# Patient Record
Sex: MALE | Race: WHITE | NOT HISPANIC OR LATINO | Employment: FULL TIME | URBAN - METROPOLITAN AREA
[De-identification: names, ages, dates, MRNs, and addresses within clinical notes are randomized per-mention and may not be internally consistent; named-entity substitution may affect disease eponyms.]

---

## 2020-12-10 ENCOUNTER — HOSPITAL ENCOUNTER (EMERGENCY)
Facility: MEDICAL CENTER | Age: 60
End: 2020-12-10
Attending: EMERGENCY MEDICINE
Payer: COMMERCIAL

## 2020-12-10 ENCOUNTER — APPOINTMENT (OUTPATIENT)
Dept: RADIOLOGY | Facility: MEDICAL CENTER | Age: 60
End: 2020-12-10
Attending: EMERGENCY MEDICINE
Payer: COMMERCIAL

## 2020-12-10 VITALS
HEIGHT: 71 IN | SYSTOLIC BLOOD PRESSURE: 164 MMHG | OXYGEN SATURATION: 93 % | DIASTOLIC BLOOD PRESSURE: 104 MMHG | HEART RATE: 96 BPM | TEMPERATURE: 96.7 F | WEIGHT: 227.51 LBS | BODY MASS INDEX: 31.85 KG/M2 | RESPIRATION RATE: 18 BRPM

## 2020-12-10 VITALS
DIASTOLIC BLOOD PRESSURE: 99 MMHG | RESPIRATION RATE: 16 BRPM | OXYGEN SATURATION: 96 % | BODY MASS INDEX: 31.79 KG/M2 | HEART RATE: 84 BPM | WEIGHT: 227.07 LBS | TEMPERATURE: 98.4 F | HEIGHT: 71 IN | SYSTOLIC BLOOD PRESSURE: 170 MMHG

## 2020-12-10 DIAGNOSIS — N23 RENAL COLIC: ICD-10-CM

## 2020-12-10 DIAGNOSIS — N20.1 URETEROLITHIASIS: ICD-10-CM

## 2020-12-10 LAB
ALBUMIN SERPL BCP-MCNC: 4.2 G/DL (ref 3.2–4.9)
ALBUMIN/GLOB SERPL: 1.7 G/DL
ALP SERPL-CCNC: 112 U/L (ref 30–99)
ALT SERPL-CCNC: 26 U/L (ref 2–50)
ANION GAP SERPL CALC-SCNC: 11 MMOL/L (ref 7–16)
APPEARANCE UR: CLEAR
AST SERPL-CCNC: 22 U/L (ref 12–45)
BACTERIA #/AREA URNS HPF: ABNORMAL /HPF
BASOPHILS # BLD AUTO: 0.3 % (ref 0–1.8)
BASOPHILS # BLD: 0.03 K/UL (ref 0–0.12)
BILIRUB SERPL-MCNC: 1.5 MG/DL (ref 0.1–1.5)
BILIRUB UR QL STRIP.AUTO: NEGATIVE
BUN SERPL-MCNC: 18 MG/DL (ref 8–22)
CALCIUM SERPL-MCNC: 8.9 MG/DL (ref 8.4–10.2)
CHLORIDE SERPL-SCNC: 102 MMOL/L (ref 96–112)
CO2 SERPL-SCNC: 23 MMOL/L (ref 20–33)
COLOR UR: YELLOW
CREAT SERPL-MCNC: 1.28 MG/DL (ref 0.5–1.4)
EOSINOPHIL # BLD AUTO: 0.02 K/UL (ref 0–0.51)
EOSINOPHIL NFR BLD: 0.2 % (ref 0–6.9)
EPI CELLS #/AREA URNS HPF: ABNORMAL /HPF
ERYTHROCYTE [DISTWIDTH] IN BLOOD BY AUTOMATED COUNT: 35.6 FL (ref 35.9–50)
GLOBULIN SER CALC-MCNC: 2.5 G/DL (ref 1.9–3.5)
GLUCOSE SERPL-MCNC: 129 MG/DL (ref 65–99)
GLUCOSE UR STRIP.AUTO-MCNC: NEGATIVE MG/DL
HCT VFR BLD AUTO: 48.1 % (ref 42–52)
HGB BLD-MCNC: 17.1 G/DL (ref 14–18)
IMM GRANULOCYTES # BLD AUTO: 0.03 K/UL (ref 0–0.11)
IMM GRANULOCYTES NFR BLD AUTO: 0.3 % (ref 0–0.9)
KETONES UR STRIP.AUTO-MCNC: 15 MG/DL
LEUKOCYTE ESTERASE UR QL STRIP.AUTO: NEGATIVE
LIPASE SERPL-CCNC: 13 U/L (ref 7–58)
LYMPHOCYTES # BLD AUTO: 0.83 K/UL (ref 1–4.8)
LYMPHOCYTES NFR BLD: 7.5 % (ref 22–41)
MCH RBC QN AUTO: 29 PG (ref 27–33)
MCHC RBC AUTO-ENTMCNC: 35.6 G/DL (ref 33.7–35.3)
MCV RBC AUTO: 81.7 FL (ref 81.4–97.8)
MICRO URNS: ABNORMAL
MONOCYTES # BLD AUTO: 0.61 K/UL (ref 0–0.85)
MONOCYTES NFR BLD AUTO: 5.5 % (ref 0–13.4)
MUCOUS THREADS #/AREA URNS HPF: ABNORMAL /HPF
NEUTROPHILS # BLD AUTO: 9.5 K/UL (ref 1.82–7.42)
NEUTROPHILS NFR BLD: 86.2 % (ref 44–72)
NITRITE UR QL STRIP.AUTO: NEGATIVE
NRBC # BLD AUTO: 0 K/UL
NRBC BLD-RTO: 0 /100 WBC
PH UR STRIP.AUTO: 5.5 [PH] (ref 5–8)
PLATELET # BLD AUTO: 178 K/UL (ref 164–446)
PMV BLD AUTO: 8.8 FL (ref 9–12.9)
POTASSIUM SERPL-SCNC: 3.9 MMOL/L (ref 3.6–5.5)
PROT SERPL-MCNC: 6.7 G/DL (ref 6–8.2)
PROT UR QL STRIP: NEGATIVE MG/DL
RBC # BLD AUTO: 5.89 M/UL (ref 4.7–6.1)
RBC # URNS HPF: ABNORMAL /HPF
RBC UR QL AUTO: ABNORMAL
SODIUM SERPL-SCNC: 136 MMOL/L (ref 135–145)
SP GR UR REFRACTOMETRY: 1.03
WBC # BLD AUTO: 11 K/UL (ref 4.8–10.8)
WBC #/AREA URNS HPF: ABNORMAL /HPF

## 2020-12-10 PROCEDURE — 83690 ASSAY OF LIPASE: CPT

## 2020-12-10 PROCEDURE — 99281 EMR DPT VST MAYX REQ PHY/QHP: CPT

## 2020-12-10 PROCEDURE — 71046 X-RAY EXAM CHEST 2 VIEWS: CPT

## 2020-12-10 PROCEDURE — 85025 COMPLETE CBC W/AUTO DIFF WBC: CPT

## 2020-12-10 PROCEDURE — 93005 ELECTROCARDIOGRAM TRACING: CPT | Performed by: EMERGENCY MEDICINE

## 2020-12-10 PROCEDURE — 81001 URINALYSIS AUTO W/SCOPE: CPT

## 2020-12-10 PROCEDURE — A9270 NON-COVERED ITEM OR SERVICE: HCPCS | Performed by: EMERGENCY MEDICINE

## 2020-12-10 PROCEDURE — 700105 HCHG RX REV CODE 258: Performed by: EMERGENCY MEDICINE

## 2020-12-10 PROCEDURE — 74176 CT ABD & PELVIS W/O CONTRAST: CPT

## 2020-12-10 PROCEDURE — 700102 HCHG RX REV CODE 250 W/ 637 OVERRIDE(OP): Performed by: EMERGENCY MEDICINE

## 2020-12-10 PROCEDURE — 99285 EMERGENCY DEPT VISIT HI MDM: CPT

## 2020-12-10 PROCEDURE — 80053 COMPREHEN METABOLIC PANEL: CPT

## 2020-12-10 PROCEDURE — 74018 RADEX ABDOMEN 1 VIEW: CPT

## 2020-12-10 RX ORDER — IBUPROFEN 200 MG
400 TABLET ORAL EVERY 6 HOURS PRN
COMMUNITY

## 2020-12-10 RX ORDER — TAMSULOSIN HYDROCHLORIDE 0.4 MG/1
0.4 CAPSULE ORAL DAILY
Qty: 5 CAP | Refills: 0 | Status: SHIPPED | OUTPATIENT
Start: 2020-12-10

## 2020-12-10 RX ORDER — SODIUM CHLORIDE 9 MG/ML
1000 INJECTION, SOLUTION INTRAVENOUS ONCE
Status: COMPLETED | OUTPATIENT
Start: 2020-12-10 | End: 2020-12-10

## 2020-12-10 RX ORDER — KETOROLAC TROMETHAMINE 30 MG/ML
30 INJECTION, SOLUTION INTRAMUSCULAR; INTRAVENOUS ONCE
Status: DISCONTINUED | OUTPATIENT
Start: 2020-12-10 | End: 2020-12-10

## 2020-12-10 RX ORDER — ONDANSETRON 4 MG/1
4 TABLET, ORALLY DISINTEGRATING ORAL EVERY 8 HOURS PRN
Qty: 9 TAB | Refills: 0 | Status: SHIPPED | OUTPATIENT
Start: 2020-12-10 | End: 2020-12-13

## 2020-12-10 RX ORDER — IBUPROFEN 600 MG/1
600 TABLET ORAL ONCE
Status: COMPLETED | OUTPATIENT
Start: 2020-12-10 | End: 2020-12-10

## 2020-12-10 RX ORDER — OXYCODONE HYDROCHLORIDE AND ACETAMINOPHEN 5; 325 MG/1; MG/1
1 TABLET ORAL EVERY 6 HOURS PRN
Qty: 12 TAB | Refills: 0 | Status: SHIPPED | OUTPATIENT
Start: 2020-12-10 | End: 2020-12-13

## 2020-12-10 RX ADMIN — SODIUM CHLORIDE 1000 ML: 9 INJECTION, SOLUTION INTRAVENOUS at 08:51

## 2020-12-10 RX ADMIN — IBUPROFEN 600 MG: 600 TABLET, FILM COATED ORAL at 07:54

## 2020-12-10 SDOH — HEALTH STABILITY: MENTAL HEALTH: HOW OFTEN DO YOU HAVE A DRINK CONTAINING ALCOHOL?: NEVER

## 2020-12-10 ASSESSMENT — PAIN DESCRIPTION - DESCRIPTORS: DESCRIPTORS: SHARP

## 2020-12-10 ASSESSMENT — FIBROSIS 4 INDEX: FIB4 SCORE: 1.41

## 2020-12-10 NOTE — ED PROVIDER NOTES
"ED Provider Note    CHIEF COMPLAINT  Chief Complaint   Patient presents with   • LLQ Pain       HPI  Akosua Castaneda is a 60 y.o. male who presents with left upper quadrant abdominal pain radiating to the back onset yesterday around noon.  Pain is constant but varies in intensity without clear trigger except the drinking tends to make it a little worse.  He took ibuprofen yesterday but denies regular NSAID use or alcohol use.  No gastritis ulcers or pancreatitis.  No diabetes or immune compromise.  No fever nausea vomiting diarrhea or constipation.  Patient denies having any medical problems.  No past surgical history of the abdomen.  Severity can be moderately severe.    REVIEW OF SYSTEMS  Pertinent positives include: Abdominal pain.  Pertinent negatives include: Urea, urgency, frequency, hematuria, family history of kidney stones, cough, chest pain, shortness of breath, loss of taste or smell, rash, leg swelling.  10+ systems reviewed and negative.      PAST MEDICAL HISTORY  Denies    FAMILY HISTORY  No renal colic    SOCIAL HISTORY  Social History     Tobacco Use   • Smoking status: Never Smoker   • Smokeless tobacco: Never Used   Substance Use Topics   • Alcohol use: Never     Frequency: Never   • Drug use: Never     Social History     Substance and Sexual Activity   Drug Use Never       SURGICAL HISTORY  Prior colonoscopy    CURRENT MEDICATIONS  Occasional ibuprofen    ALLERGIES  No Known Allergies    PHYSICAL EXAM  VITAL SIGNS: BP (!) 169/113   Pulse 95   Temp 36.3 °C (97.4 °F) (Oral)   Resp 14   Ht 1.803 m (5' 11\")   Wt 103 kg (227 lb 1.2 oz)   SpO2 92%   BMI 31.67 kg/m²   Reviewed and hypertensive  Constitutional: Well developed, Well nourished, currently overweight, well-appearing.  HENT: Normocephalic, atraumatic, bilateral external ears normal, Wearing mask.   Eyes: PERRLA, conjunctiva pink, no scleral icterus.   Cardiovascular: Regular S1-S2 without murmur, rub, gallop.  No dependent edema or " calf tenderness.  Respiratory: No rales, rhonchi, wheeze or cough.  Gastrointestinal: Soft, mild to moderate left upper quadrant abdominal tenderness with voluntary guarding to deep palpation, no rebound, no distention, no masses, bowel sounds present, no McBurney's point tenderness. Olson's sign negative.  Skin: No erythema, no rash.   Genitourinary:  No costovertebral angle tenderness.  No inguinal hernias or testicular tenderness  Neurologic: Alert & oriented x 3, cranial nerves 2-12 intact by passive exam.  No focal deficit noted.  Psychiatric: Affect normal, Judgment normal, Mood normal.     DIFFERENTIAL DIAGNOSIS:  Gastritis, peptic ulcer disease, pancreatitis, pyelonephritis, renal colic, myocardial ischemia, pneumonia, doubt splenic infarct, doubt perforation, doubt colitis.    EKG  EKG Interpretation 12:56 PM    Interpreted by me.  Indication: Left upper quadrant pain    Rhythm: normal sinus   Rate: Normal at 89  Axis: normal  Ectopy: none  Conduction: normal  ST/T Waves: no acute change  Q Waves: none  R Wave progression: Poor septal R wave progression    Clinical Impression: Sinus rhythm with possible prior anterior septal ischemia    RADIOLOGY/PROCEDURES  [id number] VIEW   Final Result      5 mm oblong calcification within the left pelvis likely representing the patient's known distal left ureteral stone.      CT-RENAL COLIC EVALUATION(A/P W/O)   Final Result      1.  4 mm left lower ureteral stone present just above the ureterovesical junction. There is moderate hydronephrosis seen above that level.      2.  Fatty change of the liver.      3.  Mild splenomegaly.      DX-CHEST-2 VIEWS   Final Result      No evidence of acute cardiopulmonary process.          LABORATORY:  Results for orders placed or performed during the hospital encounter of 12/10/20   CBC WITH DIFFERENTIAL   Result Value Ref Range    WBC 11.0 (H) 4.8 - 10.8 K/uL    RBC 5.89 4.70 - 6.10 M/uL    Hemoglobin 17.1 14.0 - 18.0 g/dL     Hematocrit 48.1 42.0 - 52.0 %    MCV 81.7 81.4 - 97.8 fL    MCH 29.0 27.0 - 33.0 pg    MCHC 35.6 (H) 33.7 - 35.3 g/dL    RDW 35.6 (L) 35.9 - 50.0 fL    Platelet Count 178 164 - 446 K/uL    MPV 8.8 (L) 9.0 - 12.9 fL    Neutrophils-Polys 86.20 (H) 44.00 - 72.00 %    Lymphocytes 7.50 (L) 22.00 - 41.00 %    Monocytes 5.50 0.00 - 13.40 %    Eosinophils 0.20 0.00 - 6.90 %    Basophils 0.30 0.00 - 1.80 %    Immature Granulocytes 0.30 0.00 - 0.90 %    Nucleated RBC 0.00 /100 WBC    Neutrophils (Absolute) 9.50 (H) 1.82 - 7.42 K/uL    Lymphs (Absolute) 0.83 (L) 1.00 - 4.80 K/uL    Monos (Absolute) 0.61 0.00 - 0.85 K/uL    Eos (Absolute) 0.02 0.00 - 0.51 K/uL    Baso (Absolute) 0.03 0.00 - 0.12 K/uL    Immature Granulocytes (abs) 0.03 0.00 - 0.11 K/uL    NRBC (Absolute) 0.00 K/uL   Comp Metabolic Panel   Result Value Ref Range    Sodium 136 135 - 145 mmol/L    Potassium 3.9 3.6 - 5.5 mmol/L    Chloride 102 96 - 112 mmol/L    Co2 23 20 - 33 mmol/L    Anion Gap 11.0 7.0 - 16.0    Glucose 129 (H) 65 - 99 mg/dL    Bun 18 8 - 22 mg/dL    Creatinine 1.28 0.50 - 1.40 mg/dL    Calcium 8.9 8.4 - 10.2 mg/dL    AST(SGOT) 22 12 - 45 U/L    ALT(SGPT) 26 2 - 50 U/L    Alkaline Phosphatase 112 (H) 30 - 99 U/L    Total Bilirubin 1.5 0.1 - 1.5 mg/dL    Albumin 4.2 3.2 - 4.9 g/dL    Total Protein 6.7 6.0 - 8.2 g/dL    Globulin 2.5 1.9 - 3.5 g/dL    A-G Ratio 1.7 g/dL   LIPASE   Result Value Ref Range    Lipase 13 7 - 58 U/L   URINALYSIS    Specimen: Blood   Result Value Ref Range    Color Yellow     Character Clear     Ph 5.5 5.0 - 8.0    Glucose Negative Negative mg/dL    Ketones 15 (A) Negative mg/dL    Protein Negative Negative mg/dL    Bilirubin Negative Negative    Nitrite Negative Negative    Leukocyte Esterase Negative Negative    Occult Blood Moderate (A) Negative    Micro Urine Req Microscopic    REFRACTOMETER SG   Result Value Ref Range    Specific Gravity 1.026    URINE MICROSCOPIC (W/UA)   Result Value Ref Range    WBC 0-2 (A) /hpf     RBC 10-20 (A) /hpf    Bacteria Rare (A) None /hpf    Epithelial Cells Few Few /hpf    Mucous Threads Few /hpf       INTERVENTIONS: Indication IV fluids renal colic for which p.o. hydration alone inadequate  Medications   ibuprofen (MOTRIN) tablet 600 mg (600 mg Oral Given 12/10/20 0754)   NS infusion 1,000 mL (0 mL Intravenous Stopped 12/10/20 0929)     Response: Proved hydration, improved pain.    COURSE & MEDICAL DECISION MAKING  This patient presents with left upper quadrant abdominal pain and left flank pain and has renal colic with ureterolithiasis with a 4.3 mm stone at the left UVJ.  There is no evidence of UTI or perinephric abscess.  There is no diverticulitis.  There is no evidence of aortoiliac disease.  There is no evidence of referred pain from the chest lungs or heart..    This patient has borderline or elevated blood pressure as recorded above and was instructed to followup with primary physician for comprehensive blood pressure evaluation and yearly fasting cholesterol assessment according to to CMS protocol.    PLAN:  Discharge Medication List as of 12/10/2020  9:44 AM      START taking these medications    Details   tamsulosin (FLOMAX) 0.4 MG capsule Take 1 Cap by mouth every day., Disp-5 Cap, R-0, Print Rx Paper           NSAIDs  PO hydration  Urine filtration  Kidney stone handout given  Return for pain and fever, severe uncontrolled pain, uncontrolled vomiting    Kaleb Craft M.D.  [address]  [facility] [address]  [phone]    Schedule an appointment as soon as possible for a visit   As needed if not better 4-5 days    [facility] [address], [address]  [facility] [id number] [phone]  Schedule an appointment as soon as possible for a visit   for blood pressure checks and preventative health      CONDITION: Stable, improved.    FINAL IMPRESSION  1. Renal colic    2. Ureterolithiasis          Electronically signed by: Camilo Schofield M.D.,  12/10/2020 7:18 AM

## 2020-12-10 NOTE — ED NOTES
Pt discharged, reviewed all discharge instructions including medications and follow up and urine filter, pt verbalizes understanding, and denies questions. Pt provided paper prescription.  Escorted to lobby.

## 2020-12-10 NOTE — DISCHARGE INSTRUCTIONS
Kidney Stones  (Ureteral Lithiasis)    Drink average fluids, filter the urine saving any stones, take flomax to help pass the stone, use ibuprofen or naproxen and Tylenol for pain.  Return to [facility] for pain and fever, uncontrolled pain or vomiting or ill appearance.  Followup with urology if not better in 4-5 days.    You had a borderline or high normal blood pressure reading today.  This does not necessarily mean you have hypertension.  Please followup with your/a primary physician for comprehensive blood pressure evaluation and yearly fasting cholesterol assessment.  BP Readings from Last 3 Encounters:   12/10/20 (!) 190/87         The pain you are experiencing is caused by a stone located in the urinary tract system. These are the channels that collect the urine in your kidneys and deliver it to your bladder. These stones usually pass through the urinary tract and are expelled in the urine. The intense pain is caused by stone movement in those channels when the ureter goes into spasm around the stone.    FOLLOW THE INSTRUCTIONS AS LISTED BELOW:  Increase your fluid intake.  Strain all urine (strainer will be provided). Keep all particulate matter and stones for your caregiver to see. The stone causing the pain may be smaller than a BB.  Take your pain medication as directed.  Make a follow-up appointment with your caregiver as directed.  You may require specialized follow-up x-rays. The absence of pain does not always mean that the stone has passed. It may have just stopped moving. If the urine remains completely obstructed, it can cause loss of kidney function or even complete destruction of the kidney. It is your responsibility that x-rays and follow-ups are completed.  If your stone does not pass on its own, additional measures may be taken by your caregiver to ensure its removal.    SEEK IMMEDIATE MEDICAL CARE IF:  Pain can not be controlled with the prescribed medication.  Fever develops. (Backed  up urine is more likely to become infected.) You may use ibuprofen and/or acetaminophen for fever until seen by a caregiver.  Pain continues for more than 18 hours.    [facility] Patient Information ©2007 [employer].

## 2020-12-10 NOTE — ED TRIAGE NOTES
Pt ambulates to room 3  Chief Complaint   Patient presents with   • LLQ Pain   started yesterday  Denies n/v/d  Denies hx of similar pain    Pt A & 0 x 4, speech clear, ambulates well  COVID-19 screening criteria completed, pt denies high risk travel and denies contact with COVID-19 positive pt      Pt oriented to room, call light within reach, gurney in lowest position

## 2020-12-11 LAB — EKG IMPRESSION: NORMAL

## 2020-12-11 NOTE — ED NOTES
Discharge instructions and Rx (x2) given to to pt with verbalized understanding.  Pt ambulatory out of the ER at this time.

## 2020-12-11 NOTE — DISCHARGE INSTRUCTIONS
Strain all urine until you passed the stone.    Return to the emergency department for any worsening pain, changing pain, fevers over 100.4, shaking chills, nausea, vomiting, cloudy or foul-smelling urine, blood in your urine, or for any concerns.    Follow-up with Dr. Kaleb Craft, urologist, within the next 1 to 2 days.  Please call tomorrow morning to schedule your appointment.    Drink plenty of fluids to stay well-hydrated.    If you are taking the Percocet, do not take over-the-counter Tylenol at the same time as the Percocet has Tylenol in it.

## 2020-12-11 NOTE — ED PROVIDER NOTES
"ED Provider Note  CHIEF COMPLAINT  Chief Complaint   Patient presents with   • Flank Pain     Reports being seen this morning for L flank pain and dx with a kidney stone, \"I was told to take tylenol or motrin for the pain but it's not working\".       HPI  Akosua Castaneda is a 60 y.o. male who presents to the ER requesting a prescription for pain medication.  The patient was here earlier today and was discharged around 11 AM.  He was discharged with a 4.3 mm left ureteral stone.  He said he was taking Tylenol and ibuprofen at home without any relief of his pain.  He was given a prescription for Flomax when he filled.  Patient denies any fevers.  No chills.  No cloudy or foul-smelling urine.  He has been straining his urine as instructed without any visualization of passed stone.  He continues to have some pain in the left lower abdomen.  He is a little bit of aching to his low back.  Sometimes he can feel little aching in his testicles.  No nausea or vomiting.  The patient does not want any pain medication here.  He wants to be able to drive to the pharmacy to get a prescription for pain medication and go home and rest.  He has not yet had time to call the urologist but intends to do so.    REVIEW OF SYSTEMS  See HPI for further details. All other systems are negative.    PAST MEDICAL HISTORY  History reviewed. No pertinent past medical history.    FAMILY HISTORY  No family history on file.    SOCIAL HISTORY  Social History     Socioeconomic History   • Marital status: Single     Spouse name: Not on file   • Number of children: Not on file   • Years of education: Not on file   • Highest education level: Not on file   Occupational History   • Not on file   Social Needs   • Financial resource strain: Not on file   • Food insecurity     Worry: Not on file     Inability: Not on file   • Transportation needs     Medical: Not on file     Non-medical: Not on file   Tobacco Use   • Smoking status: Never Smoker   • " "Smokeless tobacco: Never Used   Substance and Sexual Activity   • Alcohol use: Never     Frequency: Never   • Drug use: Never   • Sexual activity: Not on file   Lifestyle   • Physical activity     Days per week: Not on file     Minutes per session: Not on file   • Stress: Not on file   Relationships   • Social connections     Talks on phone: Not on file     Gets together: Not on file     Attends Confucianism service: Not on file     Active member of club or organization: Not on file     Attends meetings of clubs or organizations: Not on file     Relationship status: Not on file   • Intimate partner violence     Fear of current or ex partner: Not on file     Emotionally abused: Not on file     Physically abused: Not on file     Forced sexual activity: Not on file   Other Topics Concern   • Not on file   Social History Narrative   • Not on file       SURGICAL HISTORY  History reviewed. No pertinent surgical history.    CURRENT MEDICATIONS  Home Medications    **Home medications have not yet been reviewed for this encounter**         ALLERGIES  No Known Allergies    PHYSICAL EXAM  VITAL SIGNS: BP (!) 164/104   Pulse 96   Temp 35.9 °C (96.7 °F) (Temporal)   Resp 18   Ht 1.803 m (5' 11\")   Wt 103.2 kg (227 lb 8.2 oz)   SpO2 93%   BMI 31.73 kg/m²      Constitutional: Well developed, well nourished; mild acute distress; Non-toxic appearance.   HENT: Normocephalic, atraumatic; Bilateral external ears normal; oropharyngeal examination deferred due to COVID-19 outbreak and lack of oropharyngeal complaint.  Eyes: PERRL, EOMI, Conjunctiva normal. No discharge.   Neck:  Supple, nontender midline; No stridor; No nuchal rigidity.   Cardiovascular: Regular rate and rhythm without murmurs, rubs, or gallop.   Thorax & Lungs: No respiratory distress, breath sounds clear to auscultation bilaterally without wheezing, rales or rhonchi. Nontender chest wall. No crepitus or subcutaneous air  Abdomen: Soft, nontender, bowel sounds " normal. No obvious masses; No pulsatile masses; no rebound, guarding, or peritoneal signs.   Skin: Good color; warm and dry without rash or petechia.  Back: Nontender, No CVA tenderness.   Extremities: Distal radial, dorsalis pedis, posterior tibial pulses are equal bilaterally; No edema; Nontender calves or saphenous, No cyanosis, No clubbing.   Musculoskeletal: Good range of motion in all major joints. No tenderness to palpation or major deformities noted.   Neurologic: Alert & oriented x 4, clear speech        COURSE & MEDICAL DECISION MAKING  Pertinent Labs & Imaging studies reviewed. (See chart for details)      Patient presents to the ER requesting pain medication.  He was seen here earlier today and was diagnosed with a 4.3 mm left ureteral stone.  The patient denies any fevers or chills in the last few hours.  No nausea or vomiting.  He has been taking Tylenol and ibuprofen at home without any relief of his pain.  He has been straining his urine.  He has not seen the stone yet.  The patient does not need any repeat blood work done.  He is just discharged a few hours ago.  He just wants a prescription for pain medicine.  He will go home with some Percocet.  He is been told to follow-up with urology.  He understands.  Is been given strict return precautions and discharge instructions and he understands treatment plan and follow-up.      I verified that the patient was wearing a mask and I was wearing appropriate PPE every time I entered the room. The patient's mask was on the patient at all times during my encounter     In prescribing controlled substances to this patient, I certify that I have obtained and reviewed the medical history of Akosua Castaneda. I have also made a good jaz effort to obtain applicable records from other providers who have treated the patient and records did not demonstrate any increased risk of substance abuse that would prevent me from prescribing controlled substances.     I  have conducted a physical exam and documented it. I have reviewed Mr. Castaneda’s prescription history as maintained by the [place] Prescription Monitoring Program.  No patterns for concern.    I have assessed the patient’s risk for abuse, dependency, and addiction using the validated Opioid Risk Tool available at https://www.mdcalc.com/aikqwn-ngcw-lrip-ort-narcotic-abuse.  Opiate risk score is 0    Given the above, I believe the benefits of controlled substance therapy outweigh the risks. The reasons for prescribing controlled substances include in my professional opinion, controlled substances are the only reasonable choice for this patient because Patient has ureteral stone and ureteral stones are extremely painful. Accordingly, I have discussed the risk and benefits, treatment plan, and alternative therapies with the patient.       FINAL IMPRESSION  1. Ureterolithiasis Acute oxyCODONE-acetaminophen (PERCOCET) 5-325 MG Tab        This dictation has been created using voice recognition software. The accuracy of the dictation is limited by the abilities of the software. I expect there may be some errors of grammar and possibly content. I made every attempt to manually correct the errors within my dictation. However, errors related to voice recognition software may still exist and should be interpreted within the appropriate context.    Electronically signed by: Ashley Wiley M.D., 12/10/2020 5:14 PM